# Patient Record
Sex: FEMALE | Race: AMERICAN INDIAN OR ALASKA NATIVE | Employment: UNEMPLOYED | URBAN - METROPOLITAN AREA
[De-identification: names, ages, dates, MRNs, and addresses within clinical notes are randomized per-mention and may not be internally consistent; named-entity substitution may affect disease eponyms.]

---

## 2022-11-26 ENCOUNTER — HOSPITAL ENCOUNTER (EMERGENCY)
Age: 42
Discharge: HOME | End: 2022-11-26
Attending: EMERGENCY MEDICINE
Payer: COMMERCIAL

## 2022-11-26 VITALS
SYSTOLIC BLOOD PRESSURE: 114 MMHG | RESPIRATION RATE: 16 BRPM | OXYGEN SATURATION: 97 % | TEMPERATURE: 97.7 F | HEIGHT: 66 IN | DIASTOLIC BLOOD PRESSURE: 74 MMHG | WEIGHT: 191.4 LBS | HEART RATE: 63 BPM | BODY MASS INDEX: 30.76 KG/M2

## 2022-11-26 DIAGNOSIS — I10 ESSENTIAL HYPERTENSION: Primary | ICD-10-CM

## 2022-11-26 LAB
ANION GAP SERPL CALCULATED.3IONS-SCNC: 7 MMOL/L (ref 3–16)
BASOPHILS ABSOLUTE: 0 K/UL (ref 0–0.2)
BASOPHILS RELATIVE PERCENT: 0.6 %
BILIRUBIN URINE: NEGATIVE
BLOOD, URINE: NEGATIVE
BUN BLDV-MCNC: 10 MG/DL (ref 7–20)
CALCIUM SERPL-MCNC: 9.4 MG/DL (ref 8.3–10.6)
CHLORIDE BLD-SCNC: 105 MMOL/L (ref 99–110)
CLARITY: CLEAR
CO2: 26 MMOL/L (ref 21–32)
COLOR: YELLOW
CREAT SERPL-MCNC: 0.6 MG/DL (ref 0.6–1.1)
CRYSTALS, UA: ABNORMAL /HPF
EOSINOPHILS ABSOLUTE: 0.1 K/UL (ref 0–0.6)
EOSINOPHILS RELATIVE PERCENT: 1.4 %
EPITHELIAL CELLS, UA: ABNORMAL /HPF (ref 0–5)
GFR SERPL CREATININE-BSD FRML MDRD: >60 ML/MIN/{1.73_M2}
GLUCOSE BLD-MCNC: 94 MG/DL (ref 70–99)
GLUCOSE URINE: NEGATIVE MG/DL
HCT VFR BLD CALC: 36 % (ref 36–48)
HEMOGLOBIN: 11.9 G/DL (ref 12–16)
KETONES, URINE: NEGATIVE MG/DL
LEUKOCYTE ESTERASE, URINE: NEGATIVE
LYMPHOCYTES ABSOLUTE: 1.6 K/UL (ref 1–5.1)
LYMPHOCYTES RELATIVE PERCENT: 22.1 %
MAGNESIUM: 2.2 MG/DL (ref 1.8–2.4)
MCH RBC QN AUTO: 32.1 PG (ref 26–34)
MCHC RBC AUTO-ENTMCNC: 33 G/DL (ref 31–36)
MCV RBC AUTO: 97.3 FL (ref 80–100)
MICROSCOPIC EXAMINATION: ABNORMAL
MONOCYTES ABSOLUTE: 0.5 K/UL (ref 0–1.3)
MONOCYTES RELATIVE PERCENT: 6.9 %
NEUTROPHILS ABSOLUTE: 4.9 K/UL (ref 1.7–7.7)
NEUTROPHILS RELATIVE PERCENT: 69 %
NITRITE, URINE: NEGATIVE
PDW BLD-RTO: 15.1 % (ref 12.4–15.4)
PH UA: 6 (ref 5–8)
PLATELET # BLD: 298 K/UL (ref 135–450)
PMV BLD AUTO: 9 FL (ref 5–10.5)
POTASSIUM REFLEX MAGNESIUM: 4.4 MMOL/L (ref 3.5–5.1)
PROTEIN UA: NEGATIVE MG/DL
RBC # BLD: 3.69 M/UL (ref 4–5.2)
RBC UA: ABNORMAL /HPF (ref 0–4)
SODIUM BLD-SCNC: 138 MMOL/L (ref 136–145)
SPECIFIC GRAVITY UA: 1.01 (ref 1–1.03)
URINE TYPE: ABNORMAL
UROBILINOGEN, URINE: 0.2 E.U./DL
WBC # BLD: 7 K/UL (ref 4–11)
WBC UA: ABNORMAL /HPF (ref 0–5)

## 2022-11-26 PROCEDURE — 99283 EMERGENCY DEPT VISIT LOW MDM: CPT

## 2022-11-26 PROCEDURE — 36415 COLL VENOUS BLD VENIPUNCTURE: CPT

## 2022-11-26 PROCEDURE — 80048 BASIC METABOLIC PNL TOTAL CA: CPT

## 2022-11-26 PROCEDURE — 81001 URINALYSIS AUTO W/SCOPE: CPT

## 2022-11-26 PROCEDURE — 83735 ASSAY OF MAGNESIUM: CPT

## 2022-11-26 PROCEDURE — 85025 COMPLETE CBC W/AUTO DIFF WBC: CPT

## 2022-11-26 RX ORDER — DESONIDE 0.5 MG/G
CREAM TOPICAL
Qty: 1 EACH | Refills: 0 | Status: SHIPPED | OUTPATIENT
Start: 2022-11-26

## 2022-11-26 ASSESSMENT — PAIN - FUNCTIONAL ASSESSMENT
PAIN_FUNCTIONAL_ASSESSMENT: NONE - DENIES PAIN
PAIN_FUNCTIONAL_ASSESSMENT: 0-10

## 2022-11-26 ASSESSMENT — PAIN DESCRIPTION - DESCRIPTORS: DESCRIPTORS: PRESSURE

## 2022-11-26 ASSESSMENT — PAIN DESCRIPTION - LOCATION: LOCATION: HEAD

## 2022-11-26 ASSESSMENT — PAIN SCALES - GENERAL: PAINLEVEL_OUTOF10: 7

## 2022-11-26 NOTE — ED PROVIDER NOTES
ED Attending Attestation Note     Date of evaluation: 11/26/2022    This patient was seen by the resident. I have seen and examined the patient, agree with the workup, evaluation, management and diagnosis. The care plan has been discussed. My assessment reveals patient here for high blood pressure reading at alcohol rehab. Patient wishes to have a checkup including labs which are all unremarkable and wished to have some scalp medication.   Blood pressure readings here were okay and it was not felt that she would need to be started on any antihypertensive medications     Bao Hendrix MD  11/26/22 3350

## 2022-11-26 NOTE — ED PROVIDER NOTES
[address] [facility] RESIDENT NOTE       Date of evaluation: 11/26/2022    Chief Complaint     Other (Patient in recovery from alcohol, states at rehab center they take her BP every morning and it is high in 140s/150s, wants generalized check up)      History of Present Illness     Darell Zavala is a 43 y.o. female with a PMHx of ETOH use in remission (last drank 3 weeks ago, no withdrawal symptoms or seizures) who presents to the emergency department today with elevated blood pressure. She states that she is currently residing at [facility] where they will take her vital signs in the morning. She has been having persistent elevation in her blood pressures with SBP in the 140s-150s. She states that she would like to have a normal blood pressure and is concerned. She is on clonidine 0.1 mg starting this morning. She has been having intermittent headaches that are somewhat chronic and she has been trying multiple anti depressants that have not alleviated her symptoms. She denies any f/a/c. She has no CP/CT or SOB. She denies any blood in her urine, increased urinary frequency or otherwise. She denies n/v/d/c. No additional symptoms at this time. Denies neurologic symptoms. Denies drug use, does smoke tobacco. Her headache is a 7/10 in severity on the right side, throbbing. She has not had coffee and wonders if it is related. She will take naproxen 500 mg with improvement in her headaches, she takes it nightly for her arthritis and headaches. She does have chronic congestion / sinus problems. Other than that mentioned above, there are no other alleviating or provoking factors associated with the patient's presentation today.     Review of Systems     Review of Systems    Review of systems is positive for elevated blood pressure, intermittent headache  Review of systems is negative for n/t/weakness   Further review of systems is negative other than that mentioned in the HPI.     Past Medical, Surgical, Family, and Social History     She has no past medical history on file. She has no past surgical history on file. Her family history is not on file. She reports that she has been smoking cigarettes. She has never used smokeless tobacco. She reports that she does not currently use alcohol. She reports that she does not currently use drugs. Medications     Previous Medications    No medications on file       Allergies     She has No Known Allergies. Physical Exam     INITIAL VITALS: BP: (!) 129/91, Temp: 98.4 °F (36.9 °C), Heart Rate: 64, Resp: 16, SpO2: 100 %     Gen: NAD, in bed comfortably, non-diaphoretic   Head/Eyes: Atraumatic. PERRL. EOMI bilaterally. No conjunctival injection or scleral icterus  ENT: Neck supple, trachea midline, no LAD. MMM, no posterior oropharyngeal erythema or exudate. External auditory meatus clear without discharge or erythema. No nasal congestion or discharge. Cardiovascular: RRR no murmurs, rubs, or gallops. Pulmonary:Lungs CTAB, no rales, wheezes, or ronchi   Abdominal: Soft, non-tender. No rebound or guarding. Non-peritoneal   MSK: no obvious long bone deformity. Skin:  Warm, dry. No rashes, ecchymosis or cyanosis. Neuro: Alert and oriented x 4. Cranial nerves grossly intact. Moving all extremities equally. Gait narrow and stable. Extremities:  No peripheral edema. Psych: Euthymic affect. Normal rate and rhythm of speech with full prosody. Linear thought process.      DiagnosticResults     EKG   None    RADIOLOGY:  No orders to display       LABS:   Results for orders placed or performed during the hospital encounter of 11/26/22   Magnesium   Result Value Ref Range    Magnesium 2.20 1.80 - 2.40 mg/dL   BMP w/ Reflex to MG   Result Value Ref Range    Sodium 138 136 - 145 mmol/L    Potassium reflex Magnesium 4.4 3.5 - 5.1 mmol/L    Chloride 105 99 - 110 mmol/L    CO2 26 21 - 32 mmol/L    Anion Gap 7 3 - 16    Glucose 94 70 - 99 mg/dL BUN 10 7 - 20 mg/dL    Creatinine 0.6 0.6 - 1.1 mg/dL    Est, Glom Filt Rate >60 >60    Calcium 9.4 8.3 - 10.6 mg/dL   CBC with Auto Differential   Result Value Ref Range    WBC 7.0 4.0 - 11.0 K/uL    RBC 3.69 (L) 4.00 - 5.20 M/uL    Hemoglobin 11.9 (L) 12.0 - 16.0 g/dL    Hematocrit 36.0 36.0 - 48.0 %    MCV 97.3 80.0 - 100.0 fL    MCH 32.1 26.0 - 34.0 pg    MCHC 33.0 31.0 - 36.0 g/dL    RDW 15.1 12.4 - 15.4 %    Platelets 412 948 - 478 K/uL    MPV 9.0 5.0 - 10.5 fL    Neutrophils % 69.0 %    Lymphocytes % 22.1 %    Monocytes % 6.9 %    Eosinophils % 1.4 %    Basophils % 0.6 %    Neutrophils Absolute 4.9 1.7 - 7.7 K/uL    Lymphocytes Absolute 1.6 1.0 - 5.1 K/uL    Monocytes Absolute 0.5 0.0 - 1.3 K/uL    Eosinophils Absolute 0.1 0.0 - 0.6 K/uL    Basophils Absolute 0.0 0.0 - 0.2 K/uL   Urinalysis with Microscopic   Result Value Ref Range    Color, UA Yellow Straw/Yellow    Clarity, UA Clear Clear    Glucose, Ur Negative Negative mg/dL    Bilirubin Urine Negative Negative    Ketones, Urine Negative Negative mg/dL    Specific Gravity, UA 1.010 1.005 - 1.030    Blood, Urine Negative Negative    pH, UA 6.0 5.0 - 8.0    Protein, UA Negative Negative mg/dL    Urobilinogen, Urine 0.2 <2.0 E.U./dL    Nitrite, Urine Negative Negative    Leukocyte Esterase, Urine Negative Negative    Microscopic Examination Not Indicated     Urine Type NotGiven     WBC, UA 0-2 0 - 5 /HPF    RBC, UA 3-4 0 - 4 /HPF    Epithelial Cells, UA 6-10 (A) 0 - 5 /HPF    Crystals, UA 1+ Other (A) None Seen /HPF       ED BEDSIDE ULTRASOUND:  None    RECENT VITALS:  BP: 114/74, Temp: 97.7 °F (36.5 °C), Heart Rate: 63,Resp: 16, SpO2: 97 %     Procedures     None    ED Course     Nursing Notes, Past Medical Hx, Past Surgical Hx, Social Hx, Allergies, and Family Hx were reviewed. The patient was given the followingmedications:  Orders Placed This Encounter   Medications    desonide (DESOWEN) 0.05 % cream     Sig: Apply topically 2 times daily. Dispense:  1 each     Refill:  0         CONSULTS:  None    ED Course as of 11/26/22 1253   Sat Nov 26, 2022   1022 Patient is a 75-year-old female with a history of EtOH abuse, currently in remission who is presenting to the emergency department today for evaluation of elevated blood pressure as well as general medical screening. Patient states that she has her blood pressure taken on a regular basis while she is at her current facility, [facility]. Here, she has had persistent elevation in her systolic blood pressure. Specifically, blood pressures in the 140s to 150s. Here in the emergency department, her blood pressure is 129/100 with an adequate MAP. Patient is in no acute distress and does have intermittent throbbing headache but believes that this is related to caffeine and states that her headache typically resolves with naproxen. She has no focal neurologic deficits on physical examination and she has a narrow and steady gait. I have low suspicion for hypertensive urgency or emergency at this time based on her normal blood pressure as well as history and physical examination findings. I do not believe patient requires CT imaging of the head at this time as patient has no focal neurologic deficits and describes what sounds like a chronic tension type headache. Further, patient does request general laboratory screening including CBC, BMP with mag, and a urinalysis. She is concerned that she may be prediabetic. Patient states that she does not have a primary care physician but would like 1. Patient recently started clonidine today for her hypertension at her facility. However, patient without evidence of active alcohol withdrawal and I believe a different medication may be beneficial for her and I have recommended that she follow-up with her primary care physician to discuss further long-term management of her underlying hypertension.  [JF]   1152 BP(!): 129/91 [JF]   1248 Patient with slight anemia, otherwise no leukocytosis on CBC. BMP without electrolyte abnormalities. Mag within normal limits. No RG. Urinalysis obtained which is largely unremarkable without evidence of glucose urea, hematuria, or evidence of infection. Overall unremarkable laboratory evaluation. [JF]      ED Course User Index  [JF] Leonidas Rowley MD       MEDICAL DECISION MAKING / ASSESSMENT / Gloria Wu is a 43 y.o. femalewith a history of present illness, physical examination, past medical history as noted above who presents to the emergency department today elevated blood pressure. Please see [facility] for evaluation and work up. This patient was also evaluated by the attending physician. All care plans werediscussed and agreed upon. Clinical Impression     1. Essential hypertension        Disposition   At this time, the patient was deemed appropriate for discharge. All laboratory and imaging findings were discussed with the patient, and the patient was given the opportunity to ask questions. All questions were answered to their satisfaction. At this time, the patient was ready to be discharged. I recommended that the patient follow up with their primary care physician in the next week to ensure symptom improvement and continued evaluation and management, I have provided them with a referral for primary care evaluation. PATIENT REFERRED TO:  [facility] Emergency Department  [address] [address]  [phone]  Go to   As needed, If symptoms worsen    [facility] [address] [facility]    Schedule an appointment as soon as possible for a visit in 1 week      DISCHARGE MEDICATIONS:  New Prescriptions    DESONIDE (DESOWEN) 0.05 % CREAM    Apply topically 2 times daily.        DISPOSITION Decision To Discharge 11/26/2022 11:27:50 AM       Leonidas Rowley MD  11/26/22 1258       Leonidas Rowley MD  11/26/22 125

## 2022-11-26 NOTE — DISCHARGE INSTRUCTIONS
You were seen in the emergency department for high blood pressure. Your labs and/or imaging were unremarkable      I recommend that you follow up with [facility] primary care physician in the next week for further evaluation and management. I recommend that you discuss alternative blood pressure medications to clonidine with your primary care provider. Please return to the emergency department if you develop a fever, have new or worsening symptoms including headache, abdominal pain, nausea, vomiting, diarrhea, chest pain or chest tightness, feel as if you are going to pass out / pass out, or have any other new or concerning symptoms.